# Patient Record
Sex: MALE | Race: BLACK OR AFRICAN AMERICAN | NOT HISPANIC OR LATINO | Employment: FULL TIME | URBAN - METROPOLITAN AREA
[De-identification: names, ages, dates, MRNs, and addresses within clinical notes are randomized per-mention and may not be internally consistent; named-entity substitution may affect disease eponyms.]

---

## 2023-09-07 PROBLEM — M16.12 PRIMARY LOCALIZED OSTEOARTHRITIS OF LEFT HIP: Status: ACTIVE | Noted: 2023-09-07

## 2023-09-07 PROBLEM — H90.3 SENSORINEURAL HEARING LOSS (SNHL) OF BOTH EARS: Status: ACTIVE | Noted: 2023-09-07

## 2023-09-07 PROBLEM — H04.123 BILATERAL DRY EYES: Status: ACTIVE | Noted: 2023-09-07

## 2023-09-07 PROBLEM — H61.23 BILATERAL IMPACTED CERUMEN: Status: ACTIVE | Noted: 2023-09-07

## 2023-09-07 PROBLEM — H40.1133 PRIMARY OPEN ANGLE GLAUCOMA OF BOTH EYES, SEVERE STAGE: Status: ACTIVE | Noted: 2023-09-07

## 2023-09-07 PROBLEM — H47.013 ISCHEMIC OPTIC NEUROPATHY, BILATERAL: Status: ACTIVE | Noted: 2023-09-07

## 2023-09-07 RX ORDER — LATANOPROST 50 UG/ML
1 SOLUTION/ DROPS OPHTHALMIC NIGHTLY
COMMUNITY
Start: 2021-10-11 | End: 2023-10-17 | Stop reason: SDUPTHER

## 2023-10-12 ENCOUNTER — APPOINTMENT (OUTPATIENT)
Dept: OTOLARYNGOLOGY | Facility: CLINIC | Age: 63
End: 2023-10-12
Payer: COMMERCIAL

## 2023-10-17 ENCOUNTER — OFFICE VISIT (OUTPATIENT)
Dept: OPHTHALMOLOGY | Facility: CLINIC | Age: 63
End: 2023-10-17
Payer: COMMERCIAL

## 2023-10-17 DIAGNOSIS — H04.123 BILATERAL DRY EYES: Primary | ICD-10-CM

## 2023-10-17 DIAGNOSIS — H40.1133 PRIMARY OPEN ANGLE GLAUCOMA (POAG) OF BOTH EYES, SEVERE STAGE: ICD-10-CM

## 2023-10-17 PROCEDURE — 99212 OFFICE O/P EST SF 10 MIN: CPT | Performed by: OPHTHALMOLOGY

## 2023-10-17 PROCEDURE — 1036F TOBACCO NON-USER: CPT | Performed by: OPHTHALMOLOGY

## 2023-10-17 RX ORDER — LATANOPROST 50 UG/ML
1 SOLUTION/ DROPS OPHTHALMIC NIGHTLY
Qty: 7.5 ML | Refills: 3 | Status: SHIPPED | OUTPATIENT
Start: 2023-10-17 | End: 2024-02-27 | Stop reason: SDUPTHER

## 2023-10-17 RX ORDER — BRIMONIDINE TARTRATE AND TIMOLOL MALEATE 2; 5 MG/ML; MG/ML
SOLUTION OPHTHALMIC
COMMUNITY
Start: 2023-08-18 | End: 2024-02-27 | Stop reason: SDUPTHER

## 2023-10-17 ASSESSMENT — SLIT LAMP EXAM - LIDS
COMMENTS: NORMAL
COMMENTS: NORMAL

## 2023-10-17 ASSESSMENT — VISUAL ACUITY
METHOD: SNELLEN - LINEAR
OS_CC: 20/20
CORRECTION_TYPE: GLASSES
OD_CC: 20/50
OD_PH_CC: 20/30

## 2023-10-17 ASSESSMENT — CUP TO DISC RATIO
OS_RATIO: 0.65
OD_RATIO: 9.85

## 2023-10-17 ASSESSMENT — TONOMETRY
OD_IOP_MMHG: 12
OS_IOP_MMHG: 12
IOP_METHOD: GOLDMANN APPLANATION

## 2023-11-15 ENCOUNTER — APPOINTMENT (OUTPATIENT)
Dept: OTOLARYNGOLOGY | Facility: CLINIC | Age: 63
End: 2023-11-15
Payer: COMMERCIAL

## 2024-02-20 ENCOUNTER — APPOINTMENT (OUTPATIENT)
Dept: OPHTHALMOLOGY | Facility: CLINIC | Age: 64
End: 2024-02-20
Payer: COMMERCIAL

## 2024-02-27 ENCOUNTER — OFFICE VISIT (OUTPATIENT)
Dept: OPHTHALMOLOGY | Facility: CLINIC | Age: 64
End: 2024-02-27
Payer: COMMERCIAL

## 2024-02-27 DIAGNOSIS — H04.123 BILATERAL DRY EYES: ICD-10-CM

## 2024-02-27 PROCEDURE — 99212 OFFICE O/P EST SF 10 MIN: CPT | Performed by: OPHTHALMOLOGY

## 2024-02-27 RX ORDER — BRIMONIDINE TARTRATE AND TIMOLOL MALEATE 2; 5 MG/ML; MG/ML
SOLUTION OPHTHALMIC
Qty: 10 ML | Refills: 11 | Status: SHIPPED | OUTPATIENT
Start: 2024-02-27 | End: 2024-04-03

## 2024-02-27 RX ORDER — LATANOPROST 50 UG/ML
1 SOLUTION/ DROPS OPHTHALMIC NIGHTLY
Qty: 7.5 ML | Refills: 3 | Status: SHIPPED | OUTPATIENT
Start: 2024-02-27

## 2024-02-27 ASSESSMENT — CONF VISUAL FIELD
OD_INFERIOR_TEMPORAL_RESTRICTION: 0
OD_INFERIOR_NASAL_RESTRICTION: 0
OD_SUPERIOR_NASAL_RESTRICTION: 0
OD_SUPERIOR_TEMPORAL_RESTRICTION: 0
OS_SUPERIOR_TEMPORAL_RESTRICTION: 0
OS_INFERIOR_NASAL_RESTRICTION: 0
OD_NORMAL: 1
OS_SUPERIOR_NASAL_RESTRICTION: 0
OS_NORMAL: 1
OS_INFERIOR_TEMPORAL_RESTRICTION: 0

## 2024-02-27 ASSESSMENT — PACHYMETRY
OS_CT(UM): 526
OD_CT(UM): 519

## 2024-02-27 ASSESSMENT — VISUAL ACUITY
OS_CC: 20/20
METHOD: SNELLEN - LINEAR
CORRECTION_TYPE: GLASSES
OD_CC: 20/40-1

## 2024-02-27 ASSESSMENT — SLIT LAMP EXAM - LIDS
COMMENTS: NORMAL
COMMENTS: NORMAL

## 2024-02-27 ASSESSMENT — CUP TO DISC RATIO
OD_RATIO: 0.85
OS_RATIO: 0.65

## 2024-02-27 ASSESSMENT — TONOMETRY
OD_IOP_MMHG: 12
OS_IOP_MMHG: 12
IOP_METHOD: GOLDMANN APPLANATION

## 2024-03-07 ENCOUNTER — OFFICE VISIT (OUTPATIENT)
Dept: OTOLARYNGOLOGY | Facility: CLINIC | Age: 64
End: 2024-03-07
Payer: COMMERCIAL

## 2024-03-07 VITALS — WEIGHT: 200.6 LBS | TEMPERATURE: 97.8 F | HEIGHT: 73 IN | BODY MASS INDEX: 26.59 KG/M2

## 2024-03-07 DIAGNOSIS — H90.3 SENSORINEURAL HEARING LOSS (SNHL) OF BOTH EARS: ICD-10-CM

## 2024-03-07 DIAGNOSIS — H61.23 BILATERAL IMPACTED CERUMEN: Primary | ICD-10-CM

## 2024-03-07 PROCEDURE — 69210 REMOVE IMPACTED EAR WAX UNI: CPT | Performed by: NURSE PRACTITIONER

## 2024-03-07 PROCEDURE — 99212 OFFICE O/P EST SF 10 MIN: CPT | Performed by: NURSE PRACTITIONER

## 2024-03-07 PROCEDURE — 1036F TOBACCO NON-USER: CPT | Performed by: NURSE PRACTITIONER

## 2024-03-07 ASSESSMENT — PATIENT HEALTH QUESTIONNAIRE - PHQ9
SUM OF ALL RESPONSES TO PHQ9 QUESTIONS 1 AND 2: 0
2. FEELING DOWN, DEPRESSED OR HOPELESS: NOT AT ALL
1. LITTLE INTEREST OR PLEASURE IN DOING THINGS: NOT AT ALL
2. FEELING DOWN, DEPRESSED OR HOPELESS: NOT AT ALL
1. LITTLE INTEREST OR PLEASURE IN DOING THINGS: NOT AT ALL
SUM OF ALL RESPONSES TO PHQ9 QUESTIONS 1 AND 2: 0

## 2024-03-07 NOTE — PROGRESS NOTES
Subjective   Patient ID: Donta Drake is a 63 y.o. male who presents for No chief complaint on file..  HPI  Patient presents to the office today for ear cleaning. He has a known high frequency bilateral sensorineural hearing loss. He denies noise exposure, ear trauma, surgery, chronic ear infections, family hx of ear disease, dizziness, vertigo, ear pain, or drainage. He has clogged ear sensation due to wax. No ear infection since last seen. He does not wear hearing aids. Hearing is stable and no fluctuations or sudden hearing loss.     Review of Systems  Negative for constitutional, eyes, cardiac, pulmonary, hepatic, renal, digestive, hematologic, epileptic, syncopal, musculo-skeletal, mental health, integumentary, hypertensive, lipid, arthritic, diabetic, thyroid or neurologic disorders (except as listed in the HPI, PMH and Problem List).     Objective   Physical Exam    CONSTITUTIONAL: No acute distress, normal facial features; No fever; no chills  VOICE: No hoarseness or other audible abnormality  RESPIRATION: Breathing comfortably, no stridor; normal breathing effort  CV: No cyanosis visible on the face and neck area  EYES:Pupils equal and round ; no erythema; conjunctiva clear; sclera white  NEURO: Alert and oriented, able to raise eyebrows symmetrical bilateral, smile with no facial droop, able to swallow  HEAD AND FACE: Symmetric facial features, no masses or lesions     Right ear examination: External ear normal. EAC with impacted cerumen. TM not visualized.  Left ear examination: External ear normal. EAC with impacted cerumen. TM not visualized.     NOSE: External nose midline  ORAL CAVITY: No lesions of external lips  NECK/LYMPH: No obvious deformity or lesions; trachea is midline  PSYCH: Alert and oriented with appropriate mood and affect.    Patient ID: Donta Drake is a 63 y.o. male.    Procedures   Using the microscope, suction and alligator forceps, both ears were cleaned. A excessive amount of  soft brown cerumen removed. Bilateral TM's inspected after cleaning. TM's are pearly grey, intact and mobile. No effusion, retractions or perforation seen.       Assessment/Plan       1. Bilateral impacted cerumen        2. Sensorineural hearing loss (SNHL) of both ears               Donta Drake is a 63-year-old male with bilateral excessive cerumen impaction and bilateral sensorineural hearing loss in the higher frequencies. The cerumen was successfully removed using microscope and instrumentation. There was an excessive amount of cerumen removed bilateral. After cleaning, patient stated that he can hear better. I recommend to follow-up every 6 months for ear cleaning. Instruction for earwax care is provided and discussion summary.     This note was created using speech recognition transcription software. Despite proofreading, several typographical errors might be present that might affect the meaning of the content. Please call with any questions.     Medical decision complexity: low     JOSIAH Quevedo-CNP 03/07/24 7:39 AM

## 2024-04-03 ENCOUNTER — DOCUMENTATION (OUTPATIENT)
Dept: OPHTHALMOLOGY | Facility: CLINIC | Age: 64
End: 2024-04-03
Payer: COMMERCIAL

## 2024-04-03 DIAGNOSIS — H40.1133 PRIMARY OPEN ANGLE GLAUCOMA (POAG) OF BOTH EYES, SEVERE STAGE: Primary | ICD-10-CM

## 2024-04-03 RX ORDER — TIMOLOL MALEATE 5 MG/ML
1 SOLUTION/ DROPS OPHTHALMIC 2 TIMES DAILY
COMMUNITY
End: 2024-04-03 | Stop reason: SDUPTHER

## 2024-04-03 RX ORDER — BRIMONIDINE TARTRATE 2 MG/ML
1 SOLUTION/ DROPS OPHTHALMIC 2 TIMES DAILY
COMMUNITY
End: 2024-04-03 | Stop reason: SDUPTHER

## 2024-04-03 NOTE — PROGRESS NOTES
Patient states Combigan co-pay is too high he is asking for a cheaper alternative, are you okay with  the drops to Brim and [place]ley BID OU?    Per Mireya: Okay to send Brim 0.2% BID BOTH EYES AND TIMOLOL 0.5% BOTH EYES     Notified pt of change and sent refill in for updated drops.    -Chavez

## 2024-04-06 RX ORDER — BRIMONIDINE TARTRATE 2 MG/ML
1 SOLUTION/ DROPS OPHTHALMIC 2 TIMES DAILY
Qty: 10 ML | Refills: 6 | Status: SHIPPED | OUTPATIENT
Start: 2024-04-06

## 2024-04-06 RX ORDER — TIMOLOL MALEATE 5 MG/ML
1 SOLUTION/ DROPS OPHTHALMIC 2 TIMES DAILY
Qty: 10 ML | Refills: 6 | Status: SHIPPED | OUTPATIENT
Start: 2024-04-06

## 2024-07-09 ENCOUNTER — APPOINTMENT (OUTPATIENT)
Dept: OPHTHALMOLOGY | Facility: CLINIC | Age: 64
End: 2024-07-09
Payer: COMMERCIAL

## 2024-09-12 ENCOUNTER — APPOINTMENT (OUTPATIENT)
Dept: OTOLARYNGOLOGY | Facility: CLINIC | Age: 64
End: 2024-09-12
Payer: COMMERCIAL

## 2024-12-23 ENCOUNTER — APPOINTMENT (OUTPATIENT)
Dept: OPHTHALMOLOGY | Facility: CLINIC | Age: 64
End: 2024-12-23
Payer: COMMERCIAL

## 2024-12-23 DIAGNOSIS — H40.1133 PRIMARY OPEN ANGLE GLAUCOMA (POAG) OF BOTH EYES, SEVERE STAGE: Primary | ICD-10-CM

## 2024-12-23 DIAGNOSIS — H04.123 BILATERAL DRY EYES: ICD-10-CM

## 2024-12-23 PROCEDURE — 92083 EXTENDED VISUAL FIELD XM: CPT | Performed by: OPHTHALMOLOGY

## 2024-12-23 PROCEDURE — 99214 OFFICE O/P EST MOD 30 MIN: CPT | Performed by: OPHTHALMOLOGY

## 2024-12-23 RX ORDER — LATANOPROST 50 UG/ML
1 SOLUTION/ DROPS OPHTHALMIC NIGHTLY
Qty: 7.5 ML | Refills: 3 | Status: SHIPPED | OUTPATIENT
Start: 2024-12-23 | End: 2025-03-23

## 2024-12-23 RX ORDER — BRIMONIDINE TARTRATE AND TIMOLOL MALEATE 2; 5 MG/ML; MG/ML
1 SOLUTION OPHTHALMIC 2 TIMES DAILY
Qty: 10 ML | Refills: 3 | Status: SHIPPED | OUTPATIENT
Start: 2024-12-23 | End: 2025-03-23

## 2024-12-23 ASSESSMENT — ENCOUNTER SYMPTOMS
PSYCHIATRIC NEGATIVE: 0
GASTROINTESTINAL NEGATIVE: 0
NEUROLOGICAL NEGATIVE: 0
CARDIOVASCULAR NEGATIVE: 0
HEMATOLOGIC/LYMPHATIC NEGATIVE: 0
EYES NEGATIVE: 1
ALLERGIC/IMMUNOLOGIC NEGATIVE: 0
ENDOCRINE NEGATIVE: 0
RESPIRATORY NEGATIVE: 0
CONSTITUTIONAL NEGATIVE: 0
MUSCULOSKELETAL NEGATIVE: 0

## 2024-12-23 ASSESSMENT — TONOMETRY
IOP_METHOD: GOLDMANN APPLANATION
OD_IOP_MMHG: 14
OS_IOP_MMHG: 13

## 2024-12-23 ASSESSMENT — SLIT LAMP EXAM - LIDS
COMMENTS: NORMAL
COMMENTS: NORMAL

## 2024-12-23 ASSESSMENT — CONF VISUAL FIELD
OD_NORMAL: 1
OD_SUPERIOR_TEMPORAL_RESTRICTION: 0
OS_SUPERIOR_TEMPORAL_RESTRICTION: 0
OS_INFERIOR_NASAL_RESTRICTION: 0
OS_NORMAL: 1
OD_SUPERIOR_NASAL_RESTRICTION: 0
OS_INFERIOR_TEMPORAL_RESTRICTION: 0
OS_SUPERIOR_NASAL_RESTRICTION: 0
OD_INFERIOR_NASAL_RESTRICTION: 0
OD_INFERIOR_TEMPORAL_RESTRICTION: 0

## 2024-12-23 ASSESSMENT — PACHYMETRY
OS_CT(UM): 526
OD_CT(UM): 519

## 2024-12-23 ASSESSMENT — REFRACTION_WEARINGRX
OD_AXIS: 095
OS_CYLINDER: -0.75
OS_SPHERE: +0.25
OD_SPHERE: -0.25
OD_CYLINDER: -0.75
OS_AXIS: 082

## 2024-12-23 ASSESSMENT — VISUAL ACUITY
OS_CC: 20/20
METHOD: SNELLEN - LINEAR
OD_CC: 20/50
OD_CC+: -2
OD_PH_CC: 20/40
OD_PH_CC+: -1
OS_CC+: -1
CORRECTION_TYPE: GLASSES

## 2024-12-23 ASSESSMENT — CUP TO DISC RATIO
OD_RATIO: 0.85
OS_RATIO: 0.65

## 2024-12-23 NOTE — PROGRESS NOTES
1-oag iop corrects to 16/15; target <13 ou  Stop timolol, start combigan ou bid + latanoprost ou am; if not reach target, slt    2-ns ou    3-jorje tears ou    Intraocular pressure (IOP) check and repeat vf os only 6-8 weeks

## 2025-01-09 ENCOUNTER — APPOINTMENT (OUTPATIENT)
Dept: OTOLARYNGOLOGY | Facility: CLINIC | Age: 65
End: 2025-01-09
Payer: COMMERCIAL

## 2025-02-17 ENCOUNTER — APPOINTMENT (OUTPATIENT)
Dept: OPHTHALMOLOGY | Facility: CLINIC | Age: 65
End: 2025-02-17
Payer: COMMERCIAL

## 2025-02-17 DIAGNOSIS — H04.123 BILATERAL DRY EYES: ICD-10-CM

## 2025-02-17 DIAGNOSIS — H40.1133 PRIMARY OPEN ANGLE GLAUCOMA (POAG) OF BOTH EYES, SEVERE STAGE: Primary | ICD-10-CM

## 2025-02-17 PROCEDURE — 99214 OFFICE O/P EST MOD 30 MIN: CPT | Performed by: OPHTHALMOLOGY

## 2025-02-17 RX ORDER — LATANOPROST 50 UG/ML
1 SOLUTION/ DROPS OPHTHALMIC NIGHTLY
Qty: 7.5 ML | Refills: 3 | Status: SHIPPED | OUTPATIENT
Start: 2025-02-17 | End: 2025-05-18

## 2025-02-17 RX ORDER — BRIMONIDINE TARTRATE AND TIMOLOL MALEATE 2; 5 MG/ML; MG/ML
1 SOLUTION OPHTHALMIC 2 TIMES DAILY
Qty: 10 ML | Refills: 3 | Status: SHIPPED | OUTPATIENT
Start: 2025-02-17 | End: 2025-05-18

## 2025-02-17 ASSESSMENT — SLIT LAMP EXAM - LIDS
COMMENTS: NORMAL
COMMENTS: NORMAL

## 2025-02-17 ASSESSMENT — TONOMETRY
OS_IOP_MMHG: 12
OD_IOP_MMHG: 11
IOP_METHOD: GOLDMANN APPLANATION

## 2025-02-17 ASSESSMENT — VISUAL ACUITY
CORRECTION_TYPE: GLASSES
OD_PH_CC: 20/50
OS_CC: 20/25
OD_CC: 20/60
OS_CC+: -1
METHOD: SNELLEN - LINEAR
OD_PH_CC+: -2

## 2025-02-17 ASSESSMENT — ENCOUNTER SYMPTOMS
ENDOCRINE NEGATIVE: 0
ALLERGIC/IMMUNOLOGIC NEGATIVE: 0
GASTROINTESTINAL NEGATIVE: 0
NEUROLOGICAL NEGATIVE: 0
CARDIOVASCULAR NEGATIVE: 0
CONSTITUTIONAL NEGATIVE: 0
MUSCULOSKELETAL NEGATIVE: 0
RESPIRATORY NEGATIVE: 0
PSYCHIATRIC NEGATIVE: 0
HEMATOLOGIC/LYMPHATIC NEGATIVE: 0
EYES NEGATIVE: 0

## 2025-02-17 ASSESSMENT — PACHYMETRY
OS_CT(UM): 526
OD_CT(UM): 519

## 2025-02-17 ASSESSMENT — CUP TO DISC RATIO
OS_RATIO: 0.65
OD_RATIO: 0.85

## 2025-06-18 ENCOUNTER — APPOINTMENT (OUTPATIENT)
Dept: OPHTHALMOLOGY | Facility: CLINIC | Age: 65
End: 2025-06-18
Payer: COMMERCIAL

## 2025-06-18 DIAGNOSIS — H40.10X2 OPEN-ANGLE GLAUCOMA OF BOTH EYES, MODERATE STAGE, UNSPECIFIED OPEN-ANGLE GLAUCOMA TYPE: Primary | ICD-10-CM

## 2025-06-18 PROCEDURE — 99214 OFFICE O/P EST MOD 30 MIN: CPT | Performed by: OPHTHALMOLOGY

## 2025-06-18 RX ORDER — LATANOPROST 50 UG/ML
1 SOLUTION/ DROPS OPHTHALMIC DAILY
Qty: 2.5 ML | Refills: 3 | Status: SHIPPED | OUTPATIENT
Start: 2025-06-18 | End: 2025-09-16

## 2025-06-18 RX ORDER — BRIMONIDINE TARTRATE AND TIMOLOL MALEATE 2; 5 MG/ML; MG/ML
1 SOLUTION OPHTHALMIC 2 TIMES DAILY
Qty: 10 ML | Refills: 3 | Status: SHIPPED | OUTPATIENT
Start: 2025-06-18 | End: 2025-09-16

## 2025-06-18 ASSESSMENT — VISUAL ACUITY
OD_PH_CC: 20/50
OS_CC: 20/25
CORRECTION_TYPE: GLASSES
OD_CC+: -2
OD_PH_CC+: -2
METHOD: SNELLEN - LINEAR
OD_CC: 20/70

## 2025-06-18 ASSESSMENT — PACHYMETRY
OS_CT(UM): 526
OD_CT(UM): 519

## 2025-06-18 ASSESSMENT — ENCOUNTER SYMPTOMS: EYES NEGATIVE: 1

## 2025-06-18 ASSESSMENT — CUP TO DISC RATIO
OD_RATIO: 0.85
OS_RATIO: 0.65

## 2025-06-18 ASSESSMENT — SLIT LAMP EXAM - LIDS
COMMENTS: NORMAL
COMMENTS: NORMAL

## 2025-06-18 ASSESSMENT — TONOMETRY
OD_IOP_MMHG: 17
IOP_METHOD: GOLDMANN APPLANATION
OS_IOP_MMHG: 15

## 2025-06-18 NOTE — PROGRESS NOTES
1-open angle glaucoma (OAG)  iop elevated, no meds for 2 days--if repeats this, slt  2-ns ou  3-jorje tears ou    Rto 4-6 moks hvf dilate

## 2025-12-22 ENCOUNTER — APPOINTMENT (OUTPATIENT)
Dept: OPHTHALMOLOGY | Facility: CLINIC | Age: 65
End: 2025-12-22
Payer: COMMERCIAL